# Patient Record
(demographics unavailable — no encounter records)

---

## 2025-02-21 NOTE — ASSESSMENT
[FreeTextEntry1] : #Cardiac FHx Pt's mother  at age 61 of MI without known heart disease. We discussed the possibility of the patient being seen by Cardiology for evaluation and peace of mind.  #HM UTD on flu and covid UTD on tdap () UTD on pap, follows with Gyn Completed gardasil Does not need STI screening today Okay with bloodwork today Taking prenatal, planning for 2nd pregnancy next year

## 2025-02-21 NOTE — HEALTH RISK ASSESSMENT
[Yes] : Yes [2 - 3 times a week (3 pts)] : 2 - 3  times a week (3 points) [1 or 2 (0 pts)] : 1 or 2 (0 points) [Never (0 pts)] : Never (0 points) [0] : 2) Feeling down, depressed, or hopeless: Not at all (0) [PHQ-2 Negative - No further assessment needed] : PHQ-2 Negative - No further assessment needed [Patient reported PAP Smear was normal] : Patient reported PAP Smear was normal [HIV test declined] : HIV test declined [Never] : Never [NO] : No

## 2025-02-21 NOTE — PHYSICAL EXAM
[No Acute Distress] : no acute distress [Well-Appearing] : well-appearing [Normal Sclera/Conjunctiva] : normal sclera/conjunctiva [EOMI] : extraocular movements intact [Non Tender] : non-tender [Non-distended] : non-distended [Normal Bowel Sounds] : normal bowel sounds [Coordination Grossly Intact] : coordination grossly intact [Normal Gait] : normal gait [Normal] : affect was normal and insight and judgment were intact

## 2025-02-21 NOTE — HISTORY OF PRESENT ILLNESS
[de-identified] : Ms. PALACIOS is a 35 year y/o F with no significant PMH who presents as a new patient today to establish care. CC annual physical  #Recent Pregnancy Gave birth 10 months ago  Uncomplicated pregnancy, some anemia at the end Vaginal delivery Planning for future pregnancy in the next year  #Pertinent History Mom  suddenly of a heart attack  Some question of PCOS in the past  PMH: Mohs procedure of basal cell during pregnancy, Dr. Davis PSH: none Allergies: PCN (rash) childhood allergy Medications: prenatal sporadically Fam Hx: mom  suddenly of heart attack at age 61, did not have known cardiac issue Social History: Lives with  Jacob, daughter Elvira, a cat Balwinder Chang Works in marketing for a [employer] Tobacco use: never Alcohol use: a couple glasses of wine per week Drug use: none Sexually active: Y, doesn't need testing today Diet & Exercise: recently got back into it, doing pelvic floor PT and pilates to get back into the swing of things Mood: good, had some low mood 4 months postpartum Firearms: N  #Health Maintenance Flu shot: UTD Covid vaccine: UTD Tdap: UTD  Gardasil: completed Pap: has Ob appt in a few weeks, no h/o abn STI screen: not today Family Planning: planning for pregnancy in a year

## 2025-05-14 NOTE — PHYSICAL EXAM
[No Xanthelasma] : no xanthelasma [Normal Venous Pressure] : normal venous pressure [No Carotid Bruit] : no carotid bruit [Normal S1, S2] : normal S1, S2 [No Murmur] : no murmur [No Rub] : no rub [Clear Lung Fields] : clear lung fields [Good Air Entry] : good air entry [Soft] : abdomen soft [Non Tender] : non-tender [Normal Gait] : normal gait [No Edema] : no edema [No Cyanosis] : no cyanosis [No Clubbing] : no clubbing [No Skin Lesions] : no skin lesions [Moves all extremities] : moves all extremities [No Focal Deficits] : no focal deficits [Normal Speech] : normal speech [Alert and Oriented] : alert and oriented [Normal memory] : normal memory [de-identified] : NAD [de-identified] : 2+ bilateral radial pulses, no subclavian bruits

## 2025-05-14 NOTE — PHYSICAL EXAM
[No Xanthelasma] : no xanthelasma [Normal Venous Pressure] : normal venous pressure [No Carotid Bruit] : no carotid bruit [Normal S1, S2] : normal S1, S2 [No Murmur] : no murmur [No Rub] : no rub [Clear Lung Fields] : clear lung fields [Good Air Entry] : good air entry [Soft] : abdomen soft [Non Tender] : non-tender [Normal Gait] : normal gait [No Edema] : no edema [No Cyanosis] : no cyanosis [No Clubbing] : no clubbing [No Skin Lesions] : no skin lesions [Moves all extremities] : moves all extremities [No Focal Deficits] : no focal deficits [Normal Speech] : normal speech [Alert and Oriented] : alert and oriented [Normal memory] : normal memory [de-identified] : NAD [de-identified] : 2+ bilateral radial pulses, no subclavian bruits

## 2025-05-14 NOTE — REASON FOR VISIT
[CV Risk Factors and Non-Cardiac Disease] : CV risk factors and non-cardiac disease [FreeTextEntry1] : 34 yo F with PMHx of HL, family history of sudden cardiac death (mom), and recent uneventful pregnancy who is referred for cardiology evaluation.  Mom  at age of 60 of a heart attack. No known risk factors. This happened on the [facility] so not much was known. 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   Pregnancy was normal no HTN. Post-partum with GI issues including intense abdominal pain after eating all foods. She had GI eval, had colonoscopy which was unremarkable. Had a EGD 5 years ago that was unremarkable so was not repeated. These pains have resolved.   Currently no symptoms. Exercise without difficulty.   Talked about testing for Lpa and such to help risk stratify and decide if she should just start a statin or try intensive lifestyle changes.   Discussed the implications of an aberrant right subclavian artery. signs and symptoms that could result in this anatomic variation. She has not had testing for this and no clear known genetics or predisposition.

## 2025-05-14 NOTE — REASON FOR VISIT
[CV Risk Factors and Non-Cardiac Disease] : CV risk factors and non-cardiac disease [FreeTextEntry1] : 34 yo F with PMHx of HL, family history of sudden cardiac death (mom), and recent uneventful pregnancy who is referred for cardiology evaluation.  Mom  at age of 60 of a heart attack. No known risk factors. This happened on the [facility] so not much was known. 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   Pregnancy was normal no HTN. Post-partum with GI issues including intense abdominal pain after eating all foods. She had GI eval, had colonoscopy which was unremarkable. Had a EGD 5 years ago that was unremarkable so was not repeated. These pains have resolved.   Currently no symptoms. Exercise without difficulty.   Talked about testing for Lpa and such to help risk stratify and decide if she should just start a statin or try intensive lifestyle changes.   Discussed the implications of an aberrant right subclavian artery. signs and symptoms that could result in this anatomic variation. She has not had testing for this and no clear known genetics or predisposition.

## 2025-05-14 NOTE — ASSESSMENT
[FreeTextEntry1] : #family history, mom  of heart attack (not clear if CAD, SCAD, ect.), found to have aberrant right subclavian artery on autopsy. HL (LDL>100).   -will check Lpa, APoB, CRP, ESR today  #Given GI symptoms with eating, reasonable to make sure she doesn't have aberrant right subclavian artery and presence of Kommerell's diverticulum. She will think about it given that her symptoms have finally resolved. She has equal pulses and no subclavian bruits.   will call with lab results and discuss role for statin